# Patient Record
Sex: FEMALE | Race: BLACK OR AFRICAN AMERICAN | NOT HISPANIC OR LATINO | Employment: OTHER | URBAN - METROPOLITAN AREA
[De-identification: names, ages, dates, MRNs, and addresses within clinical notes are randomized per-mention and may not be internally consistent; named-entity substitution may affect disease eponyms.]

---

## 2020-05-12 ENCOUNTER — HOSPITAL ENCOUNTER (EMERGENCY)
Facility: HOSPITAL | Age: 40
Discharge: HOME | End: 2020-05-13
Attending: EMERGENCY MEDICINE
Payer: MEDICAID

## 2020-05-12 DIAGNOSIS — L02.01 FACIAL ABSCESS: Primary | ICD-10-CM

## 2020-05-12 LAB
ALBUMIN SERPL BCP-MCNC: 4 G/DL (ref 3.5–5.2)
ALP SERPL-CCNC: 93 U/L (ref 55–135)
ALT SERPL W/O P-5'-P-CCNC: 18 U/L (ref 10–44)
ANION GAP SERPL CALC-SCNC: 13 MMOL/L (ref 8–16)
AST SERPL-CCNC: 25 U/L (ref 10–40)
BASOPHILS # BLD AUTO: 0.03 K/UL (ref 0–0.2)
BASOPHILS NFR BLD: 0.2 % (ref 0–1.9)
BILIRUB SERPL-MCNC: 0.8 MG/DL (ref 0.1–1)
BUN SERPL-MCNC: 10 MG/DL (ref 6–20)
CALCIUM SERPL-MCNC: 9.2 MG/DL (ref 8.7–10.5)
CHLORIDE SERPL-SCNC: 87 MMOL/L (ref 95–110)
CO2 SERPL-SCNC: 29 MMOL/L (ref 23–29)
CREAT SERPL-MCNC: 0.8 MG/DL (ref 0.5–1.4)
DIFFERENTIAL METHOD: ABNORMAL
EOSINOPHIL # BLD AUTO: 0 K/UL (ref 0–0.5)
EOSINOPHIL NFR BLD: 0.1 % (ref 0–8)
ERYTHROCYTE [DISTWIDTH] IN BLOOD BY AUTOMATED COUNT: 12.3 % (ref 11.5–14.5)
EST. GFR  (AFRICAN AMERICAN): >60 ML/MIN/1.73 M^2
EST. GFR  (NON AFRICAN AMERICAN): >60 ML/MIN/1.73 M^2
GLUCOSE SERPL-MCNC: 141 MG/DL (ref 70–110)
HCT VFR BLD AUTO: 39.8 % (ref 37–48.5)
HGB BLD-MCNC: 13 G/DL (ref 12–16)
IMM GRANULOCYTES # BLD AUTO: 0.06 K/UL (ref 0–0.04)
IMM GRANULOCYTES NFR BLD AUTO: 0.4 % (ref 0–0.5)
INR PPP: 1.1
LACTATE SERPL-SCNC: 1.7 MMOL/L (ref 0.5–1.9)
LYMPHOCYTES # BLD AUTO: 2 K/UL (ref 1–4.8)
LYMPHOCYTES NFR BLD: 12.8 % (ref 18–48)
MCH RBC QN AUTO: 26 PG (ref 27–31)
MCHC RBC AUTO-ENTMCNC: 32.7 G/DL (ref 32–36)
MCV RBC AUTO: 80 FL (ref 82–98)
MONOCYTES # BLD AUTO: 1.1 K/UL (ref 0.3–1)
MONOCYTES NFR BLD: 7.2 % (ref 4–15)
NEUTROPHILS # BLD AUTO: 12.2 K/UL (ref 1.8–7.7)
NEUTROPHILS NFR BLD: 79.3 % (ref 38–73)
NRBC BLD-RTO: 0 /100 WBC
PLATELET # BLD AUTO: 319 K/UL (ref 150–350)
PMV BLD AUTO: 11.2 FL (ref 9.2–12.9)
POTASSIUM SERPL-SCNC: 3 MMOL/L (ref 3.5–5.1)
PROT SERPL-MCNC: 9.5 G/DL (ref 6–8.4)
PROTHROMBIN TIME: 13.7 SEC (ref 10.6–14.8)
RBC # BLD AUTO: 5 M/UL (ref 4–5.4)
SODIUM SERPL-SCNC: 129 MMOL/L (ref 136–145)
WBC # BLD AUTO: 15.36 K/UL (ref 3.9–12.7)

## 2020-05-12 PROCEDURE — 99285 EMERGENCY DEPT VISIT HI MDM: CPT | Mod: 25

## 2020-05-12 PROCEDURE — 96361 HYDRATE IV INFUSION ADD-ON: CPT

## 2020-05-12 PROCEDURE — 36415 COLL VENOUS BLD VENIPUNCTURE: CPT

## 2020-05-12 PROCEDURE — 85610 PROTHROMBIN TIME: CPT

## 2020-05-12 PROCEDURE — 83605 ASSAY OF LACTIC ACID: CPT

## 2020-05-12 PROCEDURE — 25000003 PHARM REV CODE 250

## 2020-05-12 PROCEDURE — 25500020 PHARM REV CODE 255: Performed by: EMERGENCY MEDICINE

## 2020-05-12 PROCEDURE — 85025 COMPLETE CBC W/AUTO DIFF WBC: CPT

## 2020-05-12 PROCEDURE — 80053 COMPREHEN METABOLIC PANEL: CPT

## 2020-05-12 RX ORDER — SODIUM CHLORIDE 9 MG/ML
500 INJECTION, SOLUTION INTRAVENOUS ONCE
Status: COMPLETED | OUTPATIENT
Start: 2020-05-12 | End: 2020-05-13

## 2020-05-12 RX ORDER — ACETAMINOPHEN 160 MG/5ML
325 SOLUTION ORAL
Status: COMPLETED | OUTPATIENT
Start: 2020-05-12 | End: 2020-05-12

## 2020-05-12 RX ADMIN — SODIUM CHLORIDE 500 ML: 0.9 INJECTION, SOLUTION INTRAVENOUS at 10:05

## 2020-05-12 RX ADMIN — ACETAMINOPHEN 326.4 MG: 160 SUSPENSION ORAL at 10:05

## 2020-05-12 RX ADMIN — IOHEXOL 70 ML: 350 INJECTION, SOLUTION INTRAVENOUS at 11:05

## 2020-05-13 VITALS
RESPIRATION RATE: 16 BRPM | WEIGHT: 57.44 LBS | DIASTOLIC BLOOD PRESSURE: 74 MMHG | OXYGEN SATURATION: 99 % | SYSTOLIC BLOOD PRESSURE: 102 MMHG | TEMPERATURE: 98 F | HEART RATE: 100 BPM

## 2020-05-13 PROCEDURE — 96367 TX/PROPH/DG ADDL SEQ IV INF: CPT

## 2020-05-13 PROCEDURE — 63600175 PHARM REV CODE 636 W HCPCS: Performed by: EMERGENCY MEDICINE

## 2020-05-13 PROCEDURE — 96365 THER/PROPH/DIAG IV INF INIT: CPT

## 2020-05-13 PROCEDURE — 25000003 PHARM REV CODE 250

## 2020-05-13 RX ORDER — POTASSIUM CHLORIDE 7.45 MG/ML
10 INJECTION INTRAVENOUS ONCE
Status: COMPLETED | OUTPATIENT
Start: 2020-05-13 | End: 2020-05-13

## 2020-05-13 RX ADMIN — CLINDAMYCIN 174 MG: 150 INJECTION, SOLUTION INTRAMUSCULAR; INTRAVENOUS at 12:05

## 2020-05-13 RX ADMIN — POTASSIUM CHLORIDE 10 MEQ: 7.46 INJECTION, SOLUTION INTRAVENOUS at 01:05

## 2020-05-13 NOTE — ED PROVIDER NOTES
Encounter Date: 5/12/2020       History     Chief Complaint   Patient presents with    Facial Pain    Fall     HPI   38yo F with h/o congenital heart disease and developmental delay presents with facial injury. Accompanied by her care taker who states the patient was running yesterday and fell onto her face. She did not lose consciousness and was acting appropriately afterwards. She had loss of 2 front teeth and one lower front tooth. She was seen by a dentist today who recommended f/u with [facility] for specialized care. She has had no bleeding or pus drainage, but has had significant swelling to the front lip. She continues to eat and drink. She has continued to walk and behave as normal without apparent deficits. Care taker also noticed minimal white pus discharge from the right eye today. No fevers.    Review of patient's allergies indicates:  No Known Allergies  No past medical history on file.  No past surgical history on file.  No family history on file.  Social History     Tobacco Use    Smoking status: Not on file   Substance Use Topics    Alcohol use: Not on file    Drug use: Not on file     Review of Systems   Unable to perform ROS: Patient nonverbal       Physical Exam     Initial Vitals   BP Pulse Resp Temp SpO2   05/12/20 2109 05/12/20 2109 05/12/20 2106 05/12/20 2109 05/12/20 2109   (!) 132/99 (!) 136 18 99.2 °F (37.3 °C) 96 %      MAP       --                Physical Exam    Constitutional:   Patient is small and thin for age. Appears to have developmental disability. She is nonverbal. Cooperative and tracks me around the room, looks to aunt for comfort.   HENT:   There is significant swelling to the upper lip with a scab across the lip. Tenderness, warmth, and mild erythema present. Poor dentition with obvious carries. There is completely broken front right upper tooth. Left incisor is loose. No bleeding or pus drainage from the mouth. Tenderness to palpation of the right face with minimal  pus present in the right eye.   Eyes: EOM are normal. Pupils are equal, round, and reactive to light. Right eye exhibits discharge. Left eye exhibits no discharge.   Does not cooperate for full EOM exam, but no evidence of entrapment when patient is tracking. Minimal white pus from rt eye. Conjunctiva normal b/l.   Neck: Normal range of motion. Neck supple.   Cardiovascular: Exam reveals no gallop and no friction rub.    No murmur heard.  Tachycardia   Pulmonary/Chest: Breath sounds normal. No respiratory distress. She has no wheezes. She has no rhonchi. She has no rales.   Abdominal: Soft. She exhibits no distension. There is no tenderness. There is no rebound and no guarding.   Musculoskeletal: She exhibits no edema or tenderness.   No evidence of injury, no apparent tenderness to palpation of all 4 extremities.   Neurological: She is alert.   Skin: Skin is warm and dry.         ED Course   Procedures  Labs Reviewed   CBC W/ AUTO DIFFERENTIAL   LACTIC ACID, PLASMA   COMPREHENSIVE METABOLIC PANEL   PROTIME-INR        MDM  38yo F with h/o congenital hear disease who presents with facial injury  Tachy to 130s in triage. Maybe 2/2 pain, infection, or agitation given her disabilities. I am concerned for presence of underlying infection and fracture and will obtain CT max face with. Labs including lactic acid pending. Tylenol liquid for pain. 500cc bolus given her tachycardia.     Josep Sin MD  Resident, PGY-3  5/12/2020 10:00 PM    PGY3 UPDATE:  Labs with leukocytosis to 15k with left shift. Given the evidence of infection, I have started clinda for facial cellulitis/abscess from suspected intraoral source. I opted for pediatric dosing given her small size (26kg) and administered first dose of 20mg/kg/day Q8hr.    Josep Sin MD  Resident, PGY-3  5/12/2020 11:34 PM      Imaging Results    None                  I have examined the patient and have reviewed the notes, assessments, and/or procedures performed by  the ED resident, I concur with her/his documentation.  I was present for the key portions of the separately billed procedures.                                     Clinical Impression:       ICD-10-CM ICD-9-CM   1. Facial abscess L02.01 682.0                                Julio Barrientos MD  05/13/20 0218